# Patient Record
Sex: FEMALE | Race: WHITE
[De-identification: names, ages, dates, MRNs, and addresses within clinical notes are randomized per-mention and may not be internally consistent; named-entity substitution may affect disease eponyms.]

---

## 2019-06-18 NOTE — HP
Admitting History and Physical





- Primary Care Physician


PCP: Olvin Villanueva





- Admission


Chief Complaint: BRCA positve


History of Present Illness: 





34  year old premenapausal female  BRCA 2 positve mutation  with H/O her 

maternal grandmother having breast cancer at age 40 and her mother passed from 

breast cancer at age 44  and tested negative  .   Mammogram and US were 

negative  and breast MRI was negative  2019


History Source: Patient


Limitations to Obtaining History: No Limitations





- Past Medical History


...LMP: 19


...Pregnant: No





- Past Surgical History


Past Surgical History: Yes: Tonsillectomy





- Smoking History


Smoking history: Never smoked


Have you smoked in the past 12 months: No





- Alcohol/Substance Use


Hx Alcohol Use: Yes (SOCIALLY)





Home Medications





- Allergies


Allergies/Adverse Reactions: 


 Allergies











Allergy/AdvReac Type Severity Reaction Status Date / Time


 


latex Allergy Severe Hives,ITCHI Verified 19 14:28





   NG  


 


No Known Drug Allergies Allergy   Verified 19 14:28














- Home Medications


Home Medications: 


Ambulatory Orders





NK [No Known Home Medication]  19 











Family Disease History





- Family Disease History


Family Disease History: CA: Grandparent (mat GM breast ca  40), Mother (breast 

ca 44 tested BRCA neg  )





Physical Examination


Constitutional: Yes: Well Nourished


Breast(s): Yes: Other (B ptotic cup breasts no palapble densities or adenopathy 

bilaterally)





Problem List





- Problems


(1) BRCA gene mutation test positive


Code(s): Z15.01 - GENETIC SUSCEPTIBILITY TO MALIGNANT NEOPLASM OF BREAST; 

Z15.09 - GENETIC SUSCEPTIBILITY TO OTHER MALIGNANT NEOPLASM   





Assessment/Plan





Bilateral total mastectomies with reconstruction

## 2019-06-20 ENCOUNTER — HOSPITAL ENCOUNTER (INPATIENT)
Dept: HOSPITAL 74 - FM/S | Age: 34
LOS: 2 days | Discharge: HOME | DRG: 941 | End: 2019-06-22
Attending: SURGERY | Admitting: SURGERY
Payer: COMMERCIAL

## 2019-06-20 VITALS — BODY MASS INDEX: 26.6 KG/M2

## 2019-06-20 DIAGNOSIS — Z80.3: ICD-10-CM

## 2019-06-20 DIAGNOSIS — Z15.01: Primary | ICD-10-CM

## 2019-06-20 PROCEDURE — 0HRVXKZ: ICD-10-PCS | Performed by: SURGERY

## 2019-06-20 PROCEDURE — 0HTV0ZZ RESECTION OF BILATERAL BREAST, OPEN APPROACH: ICD-10-PCS | Performed by: SURGERY

## 2019-06-20 RX ADMIN — CEFAZOLIN SODIUM SCH MLS/HR: 1 INJECTION, SOLUTION INTRAVENOUS at 20:29

## 2019-06-20 RX ADMIN — DEXTROSE AND SODIUM CHLORIDE SCH: 5; 450 INJECTION, SOLUTION INTRAVENOUS at 18:51

## 2019-06-20 RX ADMIN — CEFAZOLIN SODIUM SCH MLS/HR: 1 INJECTION, SOLUTION INTRAVENOUS at 16:25

## 2019-06-20 NOTE — OP
Operative Note





- Note:


Operative Date: 06/20/19


Pre-Operative Diagnosis: Acquired chest wall deformity s/p bilateral 

prophylactic mastectomy


Operation: Bilateral breast reconstruction with alloderm and implants


Findings: 


as dictated


Implants: as dictated


Post-Operative Diagnosis: Same as Pre-op


Surgeon: Jun Ryan


Assistant: Rodolfo Mcghee


Anesthesiologist/CRNA: Jeremy Cody


Anesthesia: General, Local (30cc Exparel injected into each chest wall/breast 

pocket at completion of case)


Specimens Removed: Bilateral breast tissue, bilateral retro areolar biopsies


Estimated Blood Loss (mls): 75 (ml)


Fluid Volume Replaced (mls): 1,400 (ml LR)


Operative Report Dictated: Yes

## 2019-06-21 LAB
DEPRECATED RDW RBC AUTO: 11.7 % (ref 11.6–15.6)
HCT VFR BLD CALC: 28.7 % (ref 32.4–45.2)
HGB BLD-MCNC: 9.4 GM/DL (ref 10.7–15.3)
MCH RBC QN AUTO: 29.4 PG (ref 25.7–33.7)
MCHC RBC AUTO-ENTMCNC: 33 G/DL (ref 32–36)
MCV RBC: 89.3 FL (ref 80–96)
PLATELET # BLD AUTO: 224 K/MM3 (ref 134–434)
PMV BLD: 9.3 FL (ref 7.5–11.1)
RBC # BLD AUTO: 3.21 M/MM3 (ref 3.6–5.2)
WBC # BLD AUTO: 10.6 K/MM3 (ref 4–10.8)

## 2019-06-21 RX ADMIN — CEFAZOLIN SODIUM SCH MLS/HR: 1 INJECTION, SOLUTION INTRAVENOUS at 15:30

## 2019-06-21 RX ADMIN — CEFAZOLIN SODIUM SCH MLS/HR: 1 INJECTION, SOLUTION INTRAVENOUS at 20:08

## 2019-06-21 RX ADMIN — ACETAMINOPHEN PRN MG: 325 TABLET ORAL at 23:57

## 2019-06-21 RX ADMIN — ACETAMINOPHEN PRN MG: 325 TABLET ORAL at 18:36

## 2019-06-21 RX ADMIN — CEFAZOLIN SODIUM SCH MLS/HR: 1 INJECTION, SOLUTION INTRAVENOUS at 02:34

## 2019-06-21 RX ADMIN — ACETAMINOPHEN PRN MG: 325 TABLET ORAL at 13:07

## 2019-06-21 RX ADMIN — ACETAMINOPHEN PRN MG: 325 TABLET ORAL at 06:31

## 2019-06-21 RX ADMIN — CEFAZOLIN SODIUM SCH MLS/HR: 1 INJECTION, SOLUTION INTRAVENOUS at 08:39

## 2019-06-21 NOTE — PN
Progress Note, Physician


Chief Complaint: 





BRCA positive S/P bilateral total mastectomies with implant and alloderm 

reconstruction POD #!


History of Present Illness: 





patient is eating ,pain managed but C/O some tightness she has been on xanax 

for aprox a week  to two bid to TID





- Current Medication List


Current Medications: 


Active Medications





Acetaminophen (Tylenol -)  650 mg PO Q4H PRN


   PRN Reason: FEVER


   Last Admin: 06/21/19 06:31 Dose:  650 mg


Alprazolam (Xanax -)  0.5 mg PO BID HARJIT


   Last Admin: 06/20/19 21:26 Dose:  0.5 mg


Cefazolin Sodium (Ancef 1 Gm Premixed Ivpb -)  1 gm in 50 mls @ 100 mls/hr IVPB 

Q6H-IV HARJIT


   Stop: 06/27/19 14:59


   Last Admin: 06/21/19 08:39 Dose:  100 mls/hr


Dextrose/Sodium Chloride (D5-1/2ns -)  1,000 mls @ 100 mls/hr IV ASDIR HARJIT


   Last Admin: 06/20/19 18:51 Dose:  Not Given


Ondansetron HCl (Zofran Injection)  4 mg IVPUSH Q6H PRN


   PRN Reason: NAUSEA AND/OR VOMITING


Oxycodone HCl (Roxicodone -)  5 mg PO Q4H PRN


   PRN Reason: PAIN LEVEL 1-5


   Last Admin: 06/21/19 08:40 Dose:  5 mg


Oxycodone HCl (Roxicodone -)  10 mg PO Q4H PRN


   PRN Reason: PAIN LEVEL 6-10


   Last Admin: 06/21/19 03:45 Dose:  10 mg











- Objective


Vital Signs: 


 Vital Signs











Temperature  98.5 F   06/21/19 04:00


 


Pulse Rate  79   06/21/19 04:00


 


Respiratory Rate  18   06/21/19 04:00


 


Blood Pressure  103/53 L  06/21/19 04:00


 


O2 Sat by Pulse Oximetry (%)  97   06/21/19 04:00











Constitutional: Yes: No Distress


Breast(s): Yes: Other (Bilateral flaps viable minimal echymosis incision intact 

with steristrips ANDERSON drains functioning well)


Labs: 


 CBC, BMP





 06/21/19 07:14 











Problem List





- Problems


(1) BRCA gene mutation test positive


Code(s): Z15.01 - GENETIC SUSCEPTIBILITY TO MALIGNANT NEOPLASM OF BREAST; 

Z15.09 - GENETIC SUSCEPTIBILITY TO OTHER MALIGNANT NEOPLASM   





Assessment/Plan





Continue IV antibiotics 


SCD in bed spirometry


 oxycodone prn


xanax alternating with valium for anxiety /tightness


plan for discharge tmr

## 2019-06-21 NOTE — OP
DATE OF OPERATION:  2019

 

PREOPERATIVE DIAGNOSIS:  Genetic susceptibility to breast cancer, BRCA2 positive.

 

POSTOPERATIVE DIAGNOSIS:  Genetic susceptibility to breast cancer, BRCA2 positive,

status post bilateral mastectomy.

 

PROCEDURE:  Bilateral nipple-sparing mastectomy through an inframammary approach with

bilateral prepectoral direct implant reconstruction with AlloDerm.

 

ANESTHESIA:  General endotracheal anesthesia.

 

PRIMARY SURGEON:  Katelyn Villanueva MD

 

FIRST ASSISTANT:  OSIRIS Olson

 

PRIMARY SURGEON FOR THE BILATERAL PREPECTORAL DIRECT IMPLANT RECONSTRUCTION:  Katelyn Ryan MD

 

COMPLICATIONS:  There were no complications.

 

INDICATIONS:  Briefly the patient is a 34-year-old  premenopausal female, Polish

but no [employer]ology ancestry.  Her mother had breast cancer at age 44 but tested BRCA

negative.  The patient herself tested BRCA2 positive in 2018 and  has been

getting close surveillance.  Recent mammography, ultrasound and MRIs have all been

negative.  She was seen in consultation regarding risk-reduction prophylactic

mastectomy.  She understood her options of close surveillance versus tamoxifen versus

prophylactic risk-reduction surgery.  She decided to go forward with surgery and

understood all risks and complications of the procedure including the risk of skin

flap necrosis, nipple loss, hematoma, and infection.  She understood our

nipple-sparing technique and that we do retroareolar biopsies at the time of surgery.

 If these show cancer we would remove the nipples.  She understood the lack of any

evidence showing for doing prophylactic sentinel lymph node biopsy.  She was seen by

Dr. Ryan and decided on the prepectoral direct implant reconstruction technique. 

She was brought in on 2019, and in the holding area a site verification was

made and informed consent was obtained.  She was marked preoperatively by Dr. Ryan.

 

She was brought into the operating room and laid on the OR table in a supine

position.  Venodynes were placed on the lower extremities prior to induction.  She

received 2 g of Ancef prior to incision.  She underwent general endotracheal

anesthesia.  Both breasts were sterilely prepped and draped in the usual fashion. 

Timeout was performed.

 

The left mastectomy was first performed through about a 9-cm inframammary incision. 

The skin edges were everted and the breast was retracted inferiorly using  Ken

clamps.  The skin flap was raised using the PEAK radiofrequency device superiorly to

the level of the clavicle, medially to the level of the sternum, laterally to the

level of the latissimus, and inferiorly below the level of the inframammary fold. 

The breast was taken out off the pectoralis major muscle using electrocautery from

inferomedial to superolateral and completely removed intact.  It was oriented with a

long lateral, short superior suture and weighed to allow for appropriate cosmetic

result.  Hemostasis was achieved.  The skin flaps were inspected and trimmed for a

good cosmetic result.  A retroareolar biopsy taken underneath the left nipple-areolar

complex sent for frozen section came back negative so the left nipple was spared.

 

At this point the right mastectomy was performed again through an inframammary

incision about 9 cm in length.  The skin edges were everted and the breast was

retracted inferiorly using  Ken clamps.  The skin flap was raised using the PEAK

radiofrequency device superiorly to the level of the clavicle, medially to the level

of the sternum, laterally to the level of the latissimus, and inferiorly below the

level of the inframammary fold.  The breast was taken out off the pectoralis major

muscle from inferomedial to superolateral and completely removed intact.  It was

oriented with a long lateral, short superior suture and weighed to allow for

appropriate cosmetic result.  The skin flaps were trimmed and inspected to remove all

visual gross breast tissue and for a good cosmetic result.  A right retroareolar

biopsy was taken and sent for frozen section and came back negative so the right

nipple was spared.

 

Hemostasis was achieved and the wound was copiously irrigated with warm sterile

saline.  At this point Dr. Ryan became the primary surgeon and performed

bilateral direct implant reconstructions in the prepectoral location using AlloDerm. 

Two Sy drains will be placed around each implant and brought out through separate

stab incisions on the lateral skin flaps and secured in place using 3-0 nylon suture.

 They will be placed on J-P _____ bulb suction.  All wounds will be closed by Plastic

Surgery with interrupted 3-0 deep dermal PDS suture and a running 4-0 subcuticular

Biosyn suture.  Mastisol and Steri-Strips will be applied over the skin and she will

be placed in a surgical bra postoperatively.  The patient did have an Exparel chest

wall block placed on each chest wall prior to closure for postoperative pain control.

 The patient will be extubated and brought to the postanesthesia care unit.  She will

be recovered and admitted postoperatively for pain and wound management.  All sponge

and needle counts were correct at this point in the case and estimated blood loss was

about 150 mL.  She was hemodynamically stable at this point in the case.  We did use

the Spy skin perfusion device both before and after implant reconstruction which did

show good perfusion of both skin flaps and nipple-areolar complexes.

 

 

KATELYN VILLANUEVA M.D.

 

[id number]

DD: 2019 12:44

DT: 2019 06:19

Job #:  [id number]

## 2019-06-21 NOTE — PN
Progress Note (short form)





- Note


Progress Note: 


ANESTHESIA POSTOP


33 YO FEMALE, POD#1, S/P BL MASTECTOMY, IMMEDIATE RECONSTRUCTION, GETA


Patient resting in bed. No complaints. Pain adequately controlled. No n/v. 

Tolerating PO. 


VSS, Afebrile


Encouraged IS and ambulation as tolerated.

## 2019-06-21 NOTE — PN
Progress Note (short form)





- Note


Progress Note: 





POD#1





Pt seen earlier this am. Pt states that her pain is around a level 5, it is 

more dull in nature. Voiding without difficulty.





 Vital Signs











 Period  Temp  Pulse  Resp  BP Sys/Xiao  Pulse Ox


 


 Last 24 Hr  98.4 F-98.5 F  75-79  16-18  /43-53  97-99








ANDERSON: RIGHT:140 ml-bloody but thin appearing


   LEFT: 130ml bloody





GEN: A&0x3, NAD


Breast: b/l nipple with good capillary refill and tissue perfusion. No evidence 

of ischemia. Warm to touch b/l. ANDERSON's stripped and functioning well. Mild 

ecchymosis b/l. steri strips c/d/i. 4x4 gauze/abd pads and surgical bra 

reapplied. 





 CBC, BMP





 06/21/19 07:14 





A/p: 33 yo female s/p b/l prophylactic mastectomy with reconstruction/alloderm 

and implants


   Pt without any evidence of tissue ischemia. Nipples warm and skin well 

perfused.


   Care as per the primary surgical team


   F/u with Dr. Ryan as an outpatient in the office


   D/w Dr. Ryan

## 2019-06-22 VITALS — HEART RATE: 80 BPM | SYSTOLIC BLOOD PRESSURE: 100 MMHG | DIASTOLIC BLOOD PRESSURE: 55 MMHG

## 2019-06-22 VITALS — TEMPERATURE: 98.4 F

## 2019-06-22 RX ADMIN — CEFAZOLIN SODIUM SCH MLS/HR: 1 INJECTION, SOLUTION INTRAVENOUS at 02:59

## 2019-06-22 RX ADMIN — DEXTROSE AND SODIUM CHLORIDE SCH: 5; 450 INJECTION, SOLUTION INTRAVENOUS at 08:18

## 2019-06-22 RX ADMIN — ACETAMINOPHEN PRN MG: 325 TABLET ORAL at 10:43

## 2019-06-22 RX ADMIN — ACETAMINOPHEN PRN MG: 325 TABLET ORAL at 03:07

## 2019-06-22 RX ADMIN — CEFAZOLIN SODIUM SCH MLS/HR: 1 INJECTION, SOLUTION INTRAVENOUS at 09:43

## 2019-06-24 NOTE — OP
DATE OF OPERATION:  06/20/2019

 

SURGEON:  Katelyn Ryan MD

 

ASSISTANT SURGEON:  Rodolfo Mcghee PA-C

 

This is a combined dictation with Dr. Katelyn iVllanueva.

 

PREOPERATIVE DIAGNOSES:  

1.  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

2.  Personal history of genetic carcinoma.

3.  Absent bilateral breasts.

 

POSTOPERATIVE DIAGNOSES:  

1.  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

2.  Personal history of genetic carcinoma.

3.  Absent bilateral breasts.

 

OPERATIVE PROCEDURE:

1.  Right breast immediate reconstruction utilizing immediate insertion of silicone

breast implant, AlloDerm reconstruction and mesh placement.

2.  Left breast immediate reconstruction utilizing immediate insertion of silicone

breast implant, AlloDerm reconstruction and mesh placement.

3.  Intravenous injection of indocyanine green dye and intraoperative diagnostic

evaluation of noncoronary intraoperative fluorescein vascular angiography x2 as well

as interpretation of angiogram intraoperatively.

 

OPERATIVE INDICATION:  Patient is a 33-year-old white female who was brought to the

operating room by Dr. Katelyn Villanueva for bilateral mastectomy for significant

genetic history of breast carcinoma.  The patient elected to undergo bilateral

mastectomy with the above reconstructive procedure in a prepectoral position.  The

risks and benefits of surgical versus nonsurgical alternatives as well as material

complications were described to the patient on multiple occasions preoperatively. 

She was marked in the standing position preoperatively in the holding area and all

questions were asked and answered.

 

OPERATIVE PROCEDURE IN DETAIL:  The patient was taken to the operating room by Dr. Villanueva where she was placed supine on the operating room table.  Both arms were

extended and padded.  Venodyne boots were placed and all areas were protected and

padded.  Dr. Villanueva will dictate his portion of the operation under separate cover.

 

At this point Dr. Villanueva performed bilateral mastectomy with an inframammary

incision on both right and left breasts which will be dictated separately.

 

Upon completion of the mastectomies the wounds were copiously irrigated and

hemostasis was meticulously obtained throughout the pocket and both chest walls.  On

the back table as the mastectomies were being performed I created reconstructive

procedures by utilizing a full coverage reconstructive implant.  A Sientra smooth

round high-profile style 107, 505 mL implant was used for the reconstruction. 

Covidien ProGrip mesh was placed in a circular fashion cutting out the mesh slightly

larger than the back side of the implant.  The gripping portion of the mesh was

placed away from the implant in order to adhere to the pectoralis muscle.  At this

point a sheet of AlloDerm was selected.  In this case a 16 x 20 perforated thick

AlloDerm was used to cover the anterior surface of the entire implant and then was

sutured down to the underlying mesh over the implant and suturing it on the back

side.  Multiple 2-0 Vicryl sutures were placed around the circumference of the

implant creating an entire enclosure for the new implant device.  Triple-antibiotic

solution and Betadine were used to cover this device and the AlloDerm was soaked in

triple-antibiotic solution x3.  Both devices were created on the back table and ready

for reconstruction.  The exact same procedure was carried out symmetrically on the

opposite left side in order to create the same device with the same size Sientra 107,

505 mL high-profile implant.

 

Once the mastectomies were completed, the left breast was attended to first.  The

device was transferred to the left chest wall and the rim which extended beyond the

implant itself was then attached to the chest wall using 0 V-Loc suture in a running

fashion from the medial to the lateral side to tack the mesh down to the pectoralis

muscle at the inframammary fold and laterally and medially in order to prevent motion

of the device.  Good shape and contour were seen in the shape of the breast at this

point.  Skin flaps showed viability.  The Spy intraoperative angiogram was performed

at this point by injecting 4 mL of indocyanine green dye into the intravascular

system and then an intraoperative angiogram was used to show adequate blood flow to

the skin and nipple-areolar complex on both chest walls.  This was repeated before

placement of the implant and then after the implant was placed.  Both showed good

blood flow.  The Spy imaging system was brought into the field.  The skin flowed to

the right and left breasts and the nipple-areolar complex, and the entire skin flaps

were evaluated and seen to be viable with good blood flow.

 

The patient had the 2nd implant placed into the right chest wall in the exact same

fashion, suturing a 0 V-Loc suture in running fashion from medial to lateral,

attaching the posterior aspect of the mesh down to the chest wall.  Once the implants

were verified in good position and shape and contour were seen the skin flaps were

draped over the implant and down into their new anatomic position.  The skin edges

were trimmed.  A 15 Sy drain was threaded through a separate stab wound around the

entire circumference of the implant itself and sutured into position.  Again, copious

irrigation and hemostasis were obtained and then attention was turned to the closure.

 

Using 3-0 PDS suture on the deep dermis and tissue the deep layer was repaired with

interrupted sutures and then a 4-0 Biosyn suture was used in a subcuticular fashion

to repair the skin.  Biopatch and Dermabond with Steri-Strips were placed over the

drain and suture line.  A compression dressing with fluff dressings, ABD gauze and a

Surgi-Bra were placed.  She was then awakened, extubated and transferred to the

recovery room in satisfactory condition and showed excellent contour and excellent

result at this point.

 

 

KATELYN RYAN M.D.

 

[id number]

DD: 06/21/2019 14:31

DT: 06/22/2019 13:51

Job #:  [id number]

## 2019-06-24 NOTE — PATH
Surgical Pathology Report



Patient Name:  DONTA CARTER

Accession #:  

Med. Rec. #:  [id number]                                                        

   /Age/Gender:  1985 (Age: 34) / F

Account:  [id number]                                                          

             Location: [facility] MED-SURG

Taken:  2019

Received:  2019

Reported:  2019

Physicians:  Olvin Villanueva M.D.

  



Specimen(s) Received

A: LEFT BREAST RETROAREOLAR BIOPSY#1 (FS) 

B: RIGHT BREAST RETRO AREOLAR BIOPSY #2 (FS) 

C: LEFT BREAST MASTECTOMY 

D: RIGHT BREAST MASTECTOMY 





Clinical History

BRCA+ prophylactic





Intraoperative Consult Diagnosis

A. Left breast retroareolar biopsy, frozen section: Negative for malignancy.

B. Right breast retroareolar biopsy, frozen section: Negative for malignancy.

LATRELL Hylton M.D., 2019









Final Diagnosis

A. BREAST, LEFT, RETROAREOLA, BIOPSY (FS):

BENIGN BREAST TISSUE; NEGATIVE FOR MALIGNANCY.



B. BREAST, RIGHT, RETROAREOLA, BIOPSY (FS):

BENIGN BREAST TISSUE; NEGATIVE FOR MALIGNANCY.



C. BREAST, LEFT, NIPPLE-SPARING MASTECTOMY:

BENIGN BREAST TISSUE SHOWING FIBROCYSTIC CHANGES.



D.

BREAST, RIGHT, NIPPLE-SPARING MASTECTOMY:

BENIGN BREAST TISSUE SHOWING FIBROCYSTIC CHANGES.





***Electronically Signed***

Latha Hylton M.D.





Gross Description

A. Received fresh labeled "left breast retroareolar biopsy," is a 0.7 x 0.5 x

0.2 cm portion of pink and red tan tissue. A frozen section is performed on the

tissue. The frozen section residue is entirely submitted in one cassette.



B. Received fresh labeled "right breast retroareolar biopsy," is a 1.4 x 0.8 x

0.3 cm portion of pink and red tan tissue. A frozen section is performed on the

tissue. The frozen section residue is entirely submitted in one cassette.



C.  Received in formalin, labeled "left breast," is a 379 gram, 13.5 x 15.0 x

3.5 cm. left mastectomy specimen with a short suture marking the superior aspect

and a long suture marking the lateral aspect of the specimen, per the surgeon.

There is no skin or nipple present. The deep margin is inked black and the

anterior soft tissue margin is inked blue. The specimen is serially sectioned

from medial to lateral. Sectioning reveals abundant dense white fibrous tissue.

Representative sections are submitted in 14 cassettes as follows: 1-3-upper

outer quadrant; 4-6-lower outer quadrant; 7-9-upper inner quadrant; 10-12-lower

inner quadrant; 13-anterior soft tissue margin; 14-deep margin.



D.  Received in formalin, labeled "right breast," is a 302 gram, 15.0 x 15.0 x

3.0 cm. right mastectomy specimen with a short suture marking the superior

aspect and a long suture marking the lateral aspect of the specimen, per the

surgeon. There is no skin or nipple present. The deep margin is inked black and

the anterior soft tissue margin is inked blue. The specimen is serially

sectioned from lateral to medial. Sectioning reveals abundant dense white

fibrous tissue. Representative sections are submitted in 13 cassettes as

follows: 1-5- lower inner quadrant (with anterior margin); 6-8-upper inner

quadrant; 9-10-upper outer quadrant; 11-12-lower outer quadrant; 13-deep margin.



Total formalin fixation time: Approximately 30 hours.





[facility]

## 2020-08-20 ENCOUNTER — HOSPITAL ENCOUNTER (OUTPATIENT)
Dept: HOSPITAL 74 - FASU | Age: 35
Discharge: HOME | End: 2020-08-20
Attending: SURGERY
Payer: COMMERCIAL

## 2020-08-20 VITALS — SYSTOLIC BLOOD PRESSURE: 117 MMHG | DIASTOLIC BLOOD PRESSURE: 72 MMHG

## 2020-08-20 VITALS — TEMPERATURE: 97.7 F

## 2020-08-20 VITALS — BODY MASS INDEX: 33 KG/M2

## 2020-08-20 VITALS — HEART RATE: 78 BPM

## 2020-08-20 DIAGNOSIS — T85.41XA: ICD-10-CM

## 2020-08-20 DIAGNOSIS — Z15.01: ICD-10-CM

## 2020-08-20 DIAGNOSIS — Z90.13: ICD-10-CM

## 2020-08-20 DIAGNOSIS — M95.4: Primary | ICD-10-CM
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
## 2020-08-20 PROCEDURE — 0HRV0JZ REPLACEMENT OF BILATERAL BREAST WITH SYNTHETIC SUBSTITUTE, OPEN APPROACH: ICD-10-PCS | Performed by: SURGERY

## 2020-08-20 PROCEDURE — 0HPU0JZ REMOVAL OF SYNTHETIC SUBSTITUTE FROM LEFT BREAST, OPEN APPROACH: ICD-10-PCS | Performed by: SURGERY

## 2020-08-20 PROCEDURE — 0HPT0JZ REMOVAL OF SYNTHETIC SUBSTITUTE FROM RIGHT BREAST, OPEN APPROACH: ICD-10-PCS | Performed by: SURGERY

## 2020-08-20 PROCEDURE — 0HRV07Z REPLACEMENT OF BILATERAL BREAST WITH AUTOLOGOUS TISSUE SUBSTITUTE, OPEN APPROACH: ICD-10-PCS | Performed by: SURGERY

## 2020-08-20 NOTE — OP
Operative Note





- Note:


Operative Date: 08/20/20


Pre-Operative Diagnosis: acquired chest wall deformity post mastectomies


Operation: Subcutaneous tissue transfer to both breasts.  Bilateral implant 

exchange


Implants: Sientra 107 620cc bilateral


Post-Operative Diagnosis: Same as Pre-op


Surgeon: Jun Ryan


Assistant: Yeison Barnett


Anesthesia: General


Specimens Removed: none


Estimated Blood Loss (mls): 25

## 2020-08-24 NOTE — PATH
Surgical Pathology Report



Patient Name:  DONTA CARTER

Accession #:  

Med. Rec. #:  [id number]                                                        

   /Age/Gender:  1985 (Age: 35) / F

Account:  [id number]                                                          

             Location: [facility] AMBULATORY 

Taken:  2020

Received:  2020

Reported:  2020

Physicians:  Jun Ryan

  



Specimen(s) Received

A: RIGHT BREAST IMPLANT 

B: LEFT BREAST IMPLANT 





Clinical History

Breast cancer







Final Diagnosis

A. RIGHT BREAST IMPLANT, REMOVAL:

CONSISTENT WITH BREAST IMPLANT. GROSS EXAMINATION ONLY.



B. LEFT BREAST IMPLANT, REMOVAL:

CONSISTENT WITH BREAST IMPLANT. GROSS EXAMINATION ONLY.







***Electronically Signed***

Laura Valdivia M.D.





Gross Description

A. Received fresh labeled "right breast implant," is a 13 cm in diameter x 4 cm

in depth clear, rubbery, intact breast implant. No soft tissue is present. No

sections are submitted, gross only.



B. Received fresh labeled "left breast implant," is a 13 cm in diameter x 4 cm

in depth clear, rubbery, intact breast implant. No soft tissue is present. No

sections are submitted, gross only.

/2020



[place]

## 2020-08-25 NOTE — OP
DATE OF OPERATION:  08/20/2020

 

SURGEON:  Olvin Ryan MD.

 

CO-SURGEON:  Yeison Barnett MD  .

 

PREOPERATIVE DIAGNOSIS:  

1.  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  

2.  Genetic history of breast carcinoma.

3.  Acquired chest wall deformity.

4.  Mechanical complication of breast implant. 

 

OPERATIVE PROCEDURE:  

1.  Right breast reconstruction utilizing other technique.

2.  Left breast reconstruction utilizing other technique.

3.  Right breast capsulectomy, removal and replacement of right breast implant.

4.  Capsulectomy, removal and replacement of left breast implant.  

 

POSTOPERATIVE DIAGNOSIS:  

1.  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  

2.  Genetic history of breast carcinoma.

3.  Acquired chest wall deformity.

4.  Mechanical complication of breast implant. 

 

OPERATIVE INDICATION:  Patient is a young woman who underwent bilateral mastectomy

and subsequent reconstruction with implants.  Patient now presents with

irregularities and contour deformity of the chest wall requiring the above

procedures.  The risks and benefits of surgical versus nonsurgical alternatives as

well as material complications were described to the patient on multiple occasions,

preoperatively, and she agreed to the planned procedure.  

 

OPERATIVE PROCEDURE IN DETAIL:  Patient was taken to the operating room, and after

induction of general anesthesia in the supine position, both arms were extended and

padded, Venodyne boots were placed.  At this point, the markings which had been made

in the standing position preoperatively for outline of procedure were reconfirmed and

timeout and preparation was begun for the surgery.  

 

At this point, Dr. Barnett and myself performed independent surgery as co-surgeons,

not helping each other, in order to facilitate the reconstructive procedure.  

 

At this point, I began the procedure on the right breast and the mastectomy scar. 

Both mastectomy scars on the right and left breast were injected with 1% local

lidocaine with 1:100,000 epinephrine and then along the mastectomy scar itself.  At

this point on the right breast, an incision was made after topical anesthesia and

hemostasis was controlled, down through the skin and to subcutaneous tissue and

through subcutaneous tissue down to underlying capsule.  The capsule was then incised

along the course of the incision and opened in order to expose the underlying

malposition implant.  The implant was then removed and sent for pathologic diagnosis

on the right breast.  Copious irrigation was performed, and modification of the

pocket to accept the new implant was carried out.  At this point, an implant was

chosen, a Sientra smooth round high profile 620-mL volume implant was placed into the

pocket to correct the deformity.  Once this was positioned, a sterile technique was

used through the Lema funnel for positioning, and then the deepest layer of the

capsule were closed after modification.  This is closed with a 3-0 PDS suture in

running fashion for watertight closure.  

 

The patient was then turned back to the abdominal wall.  The previous scars on the

abdominal wall were excised in the usual fashion after injection, and then dissection

was carried down through the deep layers to the subcutaneous tissue to the underlying

musculature.  Tissue was then harvested from the anterior abdominal wall over the

rectus muscle, laterally over the oblique fascia and extending into the flanks.  The

tissue was then transferred to the back table, washed and cleansed and prepared for

reconstructive purposes.  

 

The exact same procedure was carried out symmetrically on the opposite left breast,

also removing the implant or replacing it with a 620-mL volume smooth round high

profile Sientra implant.  Once the tissue was ready for reconstruction, it was

transferred to the right and left breast symmetrically, placing it in the medial,

central, and superior as well as lateral and inferior portions of the right breast,

and then independently by put in the position of the left breast similarly.  Once the

tissue was in place, good shape and contour was seen in the sitting position.  The

donor site on the abdominal wall was closed with interrupted running sutures, and the

mastectomy scars were also closed.  The left breast also was corrected in a similar

fashion to the right and will be dictated in the same way.  The patient tolerated

procedure well.  She was dressed in a Surgi-Bra with a fluff dressing and went to the

recovery room in satisfactory condition.  

 

 

OLVIN RYAN M.D.

 

[id number]

DD: 08/25/2020 11:23

DT: 08/25/2020 16:50

Job #:  [id number]

## 2020-08-26 NOTE — OP
DATE OF OPERATION:  08/20/2020

 

PREOPERATIVE DIAGNOSES:

1.  Genetic predisposition for the development of breast cancer.

2.  Acquired absence of bilateral breasts.

3.  Deformity of bilateral reconstructed breasts.

 

POSTOPERATIVE DIAGNOSES:  

1.  Genetic predisposition for the development of breast cancer.

2.  Acquired absence of bilateral breasts.

3.  Deformity of bilateral reconstructed breasts.

 

PROCEDURE:

1.  Bilateral open periprosthetic capsulectomies with implant removal.

2.  Bilateral breast implant insertion (Sientra high-profile 650 mL smooth round

silicone).

3.  Subcutaneous tissue transfer from abdomen and bilateral flank to bilateral breast

deformities.

 

ATTENDING SURGEON:  Yeison Hanna MD

 

CO-SURGEON:  JOVANNI Ryan MD

 

ANESTHESIA:  General endotracheal.

 

ESTIMATED BLOOD LOSS:  10 mL 

 

SPECIMEN:  

1.  Right capsule to Pathology.

2.  Left capsule to Pathology.

 

DRAINS:  None.

 

COMPLICATIONS:  None.

 

CONDITION:  Stable to recovery room, extubated.

 

INDICATIONS:  The patient is a 35-year-old female with a history of bilateral

nipple-sparing mastectomies for breast cancer risk reduction.  She has healed well

and now presents for secondary reconstruction in order to perform capsulectomies and

implant exchanges as well as correct any contour deformities with subcutaneous tissue

transfer.  The risks, benefits and alternatives of the reconstructive procedures were

discussed with the patient preoperatively in detail and all questions were answered. 

The risks include but are not limited to bleeding, infection, pain, need for revision

or further surgery, residual breast deformity, residual breast asymmetry, damage to

neighboring structures including nerves, arteries, veins and tendons.  The patient

understands these risks and has elected to proceed with surgery.  

 

PROCEDURE:  After proper identification and marking the patient in the preoperative

holding area, the patient was transported to the operating room and placed supine on

the table while noninvasive anesthesia monitors were applied.  Intravenous access was

established.  General anesthesia was administered and the patient was intubated

without difficulty.  SCD boots were applied to bilateral lower extremities. 

Intravenous antibiotics were then given.  At this point the patient's bilateral

breasts as well as the abdomen and flanks were prepped and draped in the usual

sterile fashion.  

 

After a timeout was performed, Dr. Ryan and TIMI began the reconstruction working

independently as co-surgeons with separate instrument setups.  

 

Attention was first turned toward the abdomen where local anesthesia consisting of

lidocaine with epinephrine was infiltrated along the planned access incisions.  No.15

blade was used to make these access incisions and standard tumescent solution was

then infiltrated into the subcutaneous tissue of the abdomen and bilateral flanks.  A

total of 2 L of tumescent solution was infiltrated.  Once an adequate amount of time

was given for the tumescent solution to take effect, the MicroAire system as loaded

with a 5-mm cannula and was hooked up sterilely to the Flypad collection basin.  At

this point subcutaneous tissue harvest was performed.  Once an adequate amount of

subcutaneous tissue had been harvested, the access incisions were closed with 5-0

plain gut in simple interrupted fashion.  On the sterile Charlotte stand the subcutaneous

tissue was then processed using multiple warm lactated Ringer washes.  The isolated

adipocytes were then loaded into 10 mL syringes in preparation for subcutaneous

tissue transfer.  

 

At this point attention was turned towards the right breast where the previous

inframammary fold scar was infiltrated with a similar local anesthetic solution.  A

No. 15 blade was used to make this incision and then the dissection was carried down

through the full thickness of the subcutaneous tissue with electrocautery.  The

underlying breast capsule was then encountered and this was incised, and the breast

implant was removed intact and passed off the field for gross examination only.  At

this point an open periprosthetic capsulotomy and capsulectomy were performed in

order to modify the right breast pocket.  Excised right breast capsule was passed off

the field to Pathology.  Once the pocket was adequately modified, the pocket was

irrigated with triple-antibiotic solution and hemostasis was ensured.  Next, the

pocket was irrigated with a dilute Betadine solution as well.  Gloves at this point

were changed and a Lema funnel was used to insert a Sientra smooth round

high-profile silicone breast implant measuring 650 mL in volume.  Once proper

positioning and orientation of the implant was confirmed, the capsule was

reapproximated and closed in a primary fashion using a 3-0 PDS in a simple running

fashion.  Once a watertight closure of the capsule was completed, the inframammary

fold incision was closed in layers using a 3-0 PDS in a buried deep dermal fashion

followed by a 3-0 V-Loc suture in a running subcuticular fashion.  Next, No. 15 blade

was used to make a separate stab incision medial to the inframammary fold and closure

line.  The isolated adipocytes were then transferred to the right breast, especially

the medial and superior poles.  A modified Fierro technique was used and the Fierro

bucket-handled cannula was used as well.  Care was taken to ensure a uniform

distribution of the adipocytes.  There was noted to be an excellent correction of any

step-offs and contour deformities with the transfer of the subcutaneous tissue.  Once

the capsulectomy, implant exchange, and subcutaneous tissue transfer had been

completed on the right side, attention was turned toward the left side where the

exact same procedures were performed and, therefore, only 1 side will be dictated.  

 

Once bilateral breast incisions were all closed, they were cleansed with normal

saline and Dermabond was applied.  The patient was this point was placed into a soft

surgical bra with care taken to ensure adequate padding with fluffs and ABD pads. 

The patient was also placed into a compressive abdominal binder and was fully

awakened and extubated without incident and was transported to recovery room in

stable condition.

 

 

YEISON HANNA M.D.

 

[id number]

DD: 08/26/2020 09:31

DT: 08/26/2020 15:22

Job #:  [id number]